# Patient Record
Sex: MALE | Race: WHITE | NOT HISPANIC OR LATINO | Employment: FULL TIME | URBAN - METROPOLITAN AREA
[De-identification: names, ages, dates, MRNs, and addresses within clinical notes are randomized per-mention and may not be internally consistent; named-entity substitution may affect disease eponyms.]

---

## 2019-09-13 ENCOUNTER — TELEPHONE (OUTPATIENT)
Dept: OTOLARYNGOLOGY | Facility: CLINIC | Age: 39
End: 2019-09-13

## 2019-09-13 ENCOUNTER — OFFICE VISIT (OUTPATIENT)
Dept: OTOLARYNGOLOGY | Facility: CLINIC | Age: 39
End: 2019-09-13
Payer: COMMERCIAL

## 2019-09-13 VITALS — BODY MASS INDEX: 32.47 KG/M2 | TEMPERATURE: 97 F | WEIGHT: 231.94 LBS | HEIGHT: 71 IN

## 2019-09-13 DIAGNOSIS — J34.0 ABSCESS OF NASAL CAVITY: Primary | ICD-10-CM

## 2019-09-13 PROCEDURE — 87077 CULTURE AEROBIC IDENTIFY: CPT

## 2019-09-13 PROCEDURE — 10060 I&D ABSCESS SIMPLE/SINGLE: CPT | Mod: S$GLB,,, | Performed by: OTOLARYNGOLOGY

## 2019-09-13 PROCEDURE — 10060 PR DRAIN SKIN ABSCESS SIMPLE: ICD-10-PCS | Mod: S$GLB,,, | Performed by: OTOLARYNGOLOGY

## 2019-09-13 PROCEDURE — 3008F PR BODY MASS INDEX (BMI) DOCUMENTED: ICD-10-PCS | Mod: CPTII,S$GLB,, | Performed by: OTOLARYNGOLOGY

## 2019-09-13 PROCEDURE — 87070 CULTURE OTHR SPECIMN AEROBIC: CPT

## 2019-09-13 PROCEDURE — 87186 SC STD MICRODIL/AGAR DIL: CPT

## 2019-09-13 PROCEDURE — 99999 PR PBB SHADOW E&M-NEW PATIENT-LVL II: CPT | Mod: PBBFAC,,, | Performed by: OTOLARYNGOLOGY

## 2019-09-13 PROCEDURE — 3008F BODY MASS INDEX DOCD: CPT | Mod: CPTII,S$GLB,, | Performed by: OTOLARYNGOLOGY

## 2019-09-13 PROCEDURE — 99999 PR PBB SHADOW E&M-NEW PATIENT-LVL II: ICD-10-PCS | Mod: PBBFAC,,, | Performed by: OTOLARYNGOLOGY

## 2019-09-13 PROCEDURE — 99203 OFFICE O/P NEW LOW 30 MIN: CPT | Mod: 25,S$GLB,, | Performed by: OTOLARYNGOLOGY

## 2019-09-13 PROCEDURE — 99203 PR OFFICE/OUTPT VISIT, NEW, LEVL III, 30-44 MIN: ICD-10-PCS | Mod: 25,S$GLB,, | Performed by: OTOLARYNGOLOGY

## 2019-09-13 RX ORDER — ACETAMINOPHEN AND CODEINE PHOSPHATE 300; 30 MG/1; MG/1
TABLET ORAL
Refills: 0 | COMMUNITY
Start: 2019-09-12

## 2019-09-13 RX ORDER — MUPIROCIN 20 MG/G
OINTMENT TOPICAL 2 TIMES DAILY
Qty: 22 G | Refills: 3 | Status: SHIPPED | OUTPATIENT
Start: 2019-09-13 | End: 2019-09-27

## 2019-09-13 RX ORDER — SULFAMETHOXAZOLE AND TRIMETHOPRIM 800; 160 MG/1; MG/1
1 TABLET ORAL 2 TIMES DAILY
Qty: 20 TABLET | Refills: 0 | Status: SHIPPED | OUTPATIENT
Start: 2019-09-13 | End: 2019-09-23

## 2019-09-13 NOTE — TELEPHONE ENCOUNTER
Clarified the use of Bactrim and and Bactroban ointment and when to take them with patient. He voice understanding.     ----- Message from Michelle Alanis sent at 9/13/2019  4:20 PM CDT -----  Contact: pt  The pt request a return call, no additional info given and can be reached at [phone]///[email or web address]

## 2019-09-13 NOTE — PROGRESS NOTES
"Subjective:   Patient: En Lake [id number], :1980   Visit date:2019 3:56 PM    Chief Complaint:  Other (boil in the nose since Tuesday.)    HPI:  En is a 38 y.o. male is here for evaluation of the following issue(s):    Right nasal swelling and pain.  Was in [facility] and had small I&D performed yesterday.  Prescribed clinda. Still with pain and swelling.  Was told to follow up with ENT.     Review of Systems:  -     Allergic/Immunologic: is allergic to bextra [valdecoxib]..  -     Constitutional: Current temp: 97.2 °F (36.2 °C) (Tympanic)      His meds, allergies, medical, surgical, social & family histories were reviewed & updated:  -     He has a current medication list which includes the following prescription(s): acetaminophen-codeine 300-30mg, mupirocin, and sulfamethoxazole-trimethoprim 800-160mg.  -     He  has no past medical history on file.   -     He does not have a problem list on file.   -     He  has no past surgical history on file.  -     He  reports that he quit smoking about 17 years ago. He does not have any smokeless tobacco history on file.  -     His family history is not on file.  -     He is allergic to bextra [valdecoxib].    Objective:     Physical Exam:  Vitals:  Temp 97.2 °F (36.2 °C) (Tympanic)   Ht 5' 11" (1.803 m)   Wt 105.2 kg (231 lb 14.8 oz)   BMI 32.35 kg/m²   Communication:  Able to communicate, no hoarseness.  Head & Face:  Normocephalic, atraumatic, no sinus tenderness.  Eyes:  Extraocular motions intact.  Ears:  Otoscopy of external auditory canals and tympanic membranes was normal, clinical speech reception thresholds grossly intact, no mass/lesion of auricle.  Nose:  Right ala with severe erythema and induration externally.  Internally with small opening of abscess.  Septum and turbs wnl  Mouth:  No mass/lesion of lips, teeth, gums, hard/soft palate, tongue, tonsils, or oropharynx.  Neck & Lymphatics:  No cervical lymphadenopathy, no neck " mass/crepitus/ asymmetry, trachea is midline, no thyroid enlargement/tenderness/mass.  Neuro/Psych: Alert with normal mood and affect.   Respiration/Chest:  Symmetric expansion during respiration, normal respiratory effort.  Skin:  Warm and intact.    Assessment & Plan:   En was seen today for other.    Diagnoses and all orders for this visit:    Abscess of nasal cavity  -     Aerobic culture    Other orders  -     sulfamethoxazole-trimethoprim 800-160mg (BACTRIM DS) 800-160 mg Tab; Take 1 tablet by mouth 2 (two) times daily for 10 days  -     mupirocin (BACTROBAN) 2 % ointment; Apply topically to nose twice daily for 10 days      D/c clinda  Bactrim and Bactroban  ER if worsening over the weekend    Incision and Drainage Procedure Note    Pre-operative Diagnosis: En was seen today for other.    Diagnoses and all orders for this visit:    Abscess of nasal cavity  -     Aerobic culture    Other orders  -     sulfamethoxazole-trimethoprim 800-160mg (BACTRIM DS) 800-160 mg Tab; Take 1 tablet by mouth 2 (two) times daily for 10 days  -     mupirocin (BACTROBAN) 2 % ointment; Apply topically to nose twice daily for 10 days        Post-operative Diagnosis: same    Anesthesia: 1% lidocaine with epinephrine    Location:  right nasal cavity    Procedure Details   The procedure, risks and complications have been discussed in detail (including, but not limited to airway compromise, infection, bleeding) with the patient, and the patient has signed consent to the procedure.    The area was sterilely prepped and draped over the affected area in the usual fashion.  After adequate local anesthesia, an incision was made. Purulent drainage: present  The patient was observed until stable.    Complications:  none.

## 2019-09-16 ENCOUNTER — TELEPHONE (OUTPATIENT)
Dept: OTOLARYNGOLOGY | Facility: CLINIC | Age: 39
End: 2019-09-16

## 2019-09-16 LAB — BACTERIA SPEC AEROBE CULT: ABNORMAL

## 2019-09-16 NOTE — TELEPHONE ENCOUNTER
Left message on  for call back.       ----- Message from Ronny Sullivan MD sent at 9/16/2019 11:37 AM CDT -----  Please let pt know his culture returned for Staph that is sensitive to Bactrim given at last OV. He should complete abx and f/u in clinic after, thank you!